# Patient Record
Sex: MALE | Race: WHITE | NOT HISPANIC OR LATINO | Employment: FULL TIME | URBAN - METROPOLITAN AREA
[De-identification: names, ages, dates, MRNs, and addresses within clinical notes are randomized per-mention and may not be internally consistent; named-entity substitution may affect disease eponyms.]

---

## 2017-12-01 ENCOUNTER — OFFICE VISIT (OUTPATIENT)
Dept: URGENT CARE | Facility: CLINIC | Age: 62
End: 2017-12-01
Payer: COMMERCIAL

## 2017-12-01 PROCEDURE — G0382 LEV 3 HOSP TYPE B ED VISIT: HCPCS

## 2017-12-01 PROCEDURE — 12011 RPR F/E/E/N/L/M 2.5 CM/<: CPT

## 2017-12-01 PROCEDURE — 99283 EMERGENCY DEPT VISIT LOW MDM: CPT

## 2017-12-05 NOTE — PROGRESS NOTES
Assessment    1  Laceration of face (383 40) (S01 81XA)   2  Head trauma (959 01) (P84 38LT)    Discussion/Summary  Discussion Summary:   Due to dangerous MERLIN, it was recommended that pt proceed to ED for further evaluation but pt refused and signed AMA  Pt vocalized understanding of risks of not seeking appropriate care  skin adhesive used to approximate wound on L temple and pt counseled on appropriate wound care  Medication Side Effects Reviewed: Possible side effects of new medications were reviewed with the patient/guardian today  Understands and agrees with treatment plan: The treatment plan was reviewed with the patient/guardian  The patient/guardian understands and agrees with the treatment plan   Counseling Documentation With Imm: The patient, patient's family was counseled regarding instructions for management,-- patient and family education,-- importance of compliance with treatment  Chief Complaint    1  Skin Wound  Chief Complaint Free Text Note Form: Fall from tree stand 6FT cut to left eye brow      History of Present Illness  HPI: 65yo M p/w lacerations to face s/p fall from tree stand 1 5 hours ago  Pt states he lost his balance and fell  Pt refuses to answer other questions- it is unclear whether pt does not remember mechanism of injury of if patient does not want to disclose information  Pt states he was hunting when he fell but does not present dressed for hunting and clothing is blood stained  Pt states he works as  in [place] and wants wounds concealed so his work will not find out  Hospital Based Practices Required Assessment:  Abuse And Domestic Violence Screen   Yes, the patient is safe at home  -- The patient states no one is hurting them  Depression And Suicide Screen  No, the patient has not had thoughts of hurting themself  No, the patient has not felt depressed in the past 7 days  Prefered Language is  [place]  Primary Language is  English        Review of Systems  Focused-Male:  Constitutional: no fever or chills, feels well, no tiredness, no recent weight loss or weight gain  ENT: no complaints of earache, no loss of hearing, no nosebleeds or nasal discharge, no sore throat or hoarseness  Cardiovascular: no complaints of slow or fast heart rate, no chest pain, no palpitations, no leg claudication or lower extremity edema  Respiratory: no complaints of shortness of breath, no wheezing or cough, no dyspnea on exertion, no orthopnea or PND  Gastrointestinal: no complaints of abdominal pain, no constipation, no nausea or vomiting, no diarrhea or bloody stools  Genitourinary: no complaints of dysuria or incontinence, no hesitancy, no nocturia, no genital lesion, no inadequacy of penile erection  Musculoskeletal: no complaints of arthralgia, no myalgia, no joint swelling or stiffness, no limb pain or swelling  Integumentary: skin wound, but-- no rashes,-- no itching,-- no dry skin-- and-- no skin lesions  Neurological: no complaints of headache, no confusion, no numbness or tingling, no dizziness or fainting  ROS Reviewed:   ROS reviewed  Past Medical History  1  No pertinent past medical history  Active Problems And Past Medical History Reviewed: The active problems and past medical history were reviewed and updated today  Family History  Family History    1  No pertinent family history  Family History Reviewed: The family history was reviewed and updated today  Social History   · Former smoker (N36 08) ([id number])  Social History Reviewed: The social history was reviewed and is unchanged  Surgical History  1  Denied: History Of Prior Surgery  Surgical History Reviewed: The surgical history was reviewed and updated today  Current Meds   1  No Reported Medications Recorded  Medication List Reviewed: The medication list was reviewed and updated today  Allergies    1   No Known Drug Allergies    Vitals  Signs   Recorded: [id number] 03:15PM   Temperature: 97 2 F  Heart Rate: 81  Systolic: 771  Diastolic: 96  Height: 6 ft 1 in  Weight: 215 lb 4 oz  BMI Calculated: 28 4  BSA Calculated: 2 22  O2 Saturation: 94  Pain Scale: 2    Physical Exam   Constitutional  General appearance: No acute distress, well appearing and well nourished  Eyes  Pupils and irises: Equal, round and reactive to light  Pulmonary  Respiratory effort: No increased work of breathing or signs of respiratory distress  Auscultation of lungs: Clear to auscultation  no rales or crackles were heard bilaterally  no rhonchi  no friction rub  no wheezing  no diminished breath sounds  Cardiovascular  Palpation of heart: Normal PMI, no thrills  Auscultation of heart: Normal rate and rhythm, normal S1 and S2, without murmurs  Abdomen  Abdomen: Non-tender, no masses  Musculoskeletal  Gait and station: Normal    Digits and nails: Normal without clubbing or cyanosis  Inspection/palpation of joints, bones, and muscles: Normal    Skin  Skin and subcutaneous tissue: Abnormal  -- puncture wound with mild tissue loss to L temple; 4mm lacerations to either eyebrow that are well approximated and shallow  Neurologic  Cranial nerves: Cranial nerves 2-12 intact  Reflexes: 2+ and symmetric  Sensation: No sensory loss  -- pt vascularly and neurologically intact  Psychiatric  Orientation to person, place and time: Normal    Mood and affect: Abnormal   Mood and Affect: blunted-- and-- calm        Signatures   Electronically signed by : CLARIBEL Fine; Dec  1 2017  4:35PM EST                       (Author)    Electronically signed by : DAVID Gordon ; Dec  1 2017  7:25PM EST                       (Co-author)

## 2018-01-23 VITALS
WEIGHT: 215.25 LBS | BODY MASS INDEX: 28.53 KG/M2 | HEART RATE: 81 BPM | HEIGHT: 73 IN | OXYGEN SATURATION: 94 % | TEMPERATURE: 97.2 F | SYSTOLIC BLOOD PRESSURE: 142 MMHG | DIASTOLIC BLOOD PRESSURE: 96 MMHG